# Patient Record
Sex: MALE | Race: WHITE
[De-identification: names, ages, dates, MRNs, and addresses within clinical notes are randomized per-mention and may not be internally consistent; named-entity substitution may affect disease eponyms.]

---

## 2020-02-21 ENCOUNTER — HOSPITAL ENCOUNTER (OUTPATIENT)
Dept: HOSPITAL 53 - M OPP | Age: 50
Discharge: HOME | End: 2020-02-21
Attending: INTERNAL MEDICINE
Payer: COMMERCIAL

## 2020-02-21 VITALS — HEIGHT: 71 IN | BODY MASS INDEX: 44.1 KG/M2 | WEIGHT: 315 LBS

## 2020-02-21 VITALS — DIASTOLIC BLOOD PRESSURE: 81 MMHG | SYSTOLIC BLOOD PRESSURE: 138 MMHG

## 2020-02-21 DIAGNOSIS — K22.8: Primary | ICD-10-CM

## 2020-02-21 DIAGNOSIS — K21.0: ICD-10-CM

## 2020-02-21 DIAGNOSIS — Z79.899: ICD-10-CM

## 2020-02-21 PROCEDURE — 43239 EGD BIOPSY SINGLE/MULTIPLE: CPT

## 2020-02-21 PROCEDURE — 88305 TISSUE EXAM BY PATHOLOGIST: CPT

## 2020-02-21 NOTE — ROOR
________________________________________________________________________________

Patient Name: Arnie Olmstead              Procedure Date: 2/21/2020 2:31 PM

MRN: [id number]                          Account Number: [id number]

YOB: 1970              Age: 49

Room: [facility]                            Gender: Male

Note Status: Finalized                 

________________________________________________________________________________

 

Procedure:           Upper GI endoscopy

Indications:         Follow-up of esophagitis, Suspected eosinophilic 

                     esophagitis

Providers:           Wilberto Bennett MD

Referring MD:        NOAM SOLANO MD

Requesting Provider: 

Medicines:           Monitored Anesthesia Care

Complications:       No immediate complications.

________________________________________________________________________________

Procedure:           Pre-Anesthesia Assessment:

                     - Prior to the procedure, a History and Physical was 

                     performed, and patient medications and allergies were 

                     reviewed. The patient is competent. The risks and 

                     benefits of the procedure and the sedation options and 

                     risks were discussed with the patient. All questions were 

                     answered and informed consent was obtained. Patient 

                     identification and proposed procedure were verified by 

                     the physician, the nurse and the anesthesiologist in the 

                     procedure room. Mental Status Examination: alert and 

                     oriented. Airway Examination: normal oropharyngeal airway 

                     and neck mobility. Respiratory Examination: clear to 

                     auscultation. CV Examination: normal. Prophylactic 

                     Antibiotics: The patient does not require prophylactic 

                     antibiotics. Prior Anticoagulants: The patient has taken 

                     no previous anticoagulant or antiplatelet agents. ASA 

                     Grade Assessment: II - A patient with mild systemic 

                     disease. After reviewing the risks and benefits, the 

                     patient was deemed in satisfactory condition to undergo 

                     the procedure. The anesthesia plan was to use monitored 

                     anesthesia care (MAC). Immediately prior to 

                     administration of medications, the patient was 

                     re-assessed for adequacy to receive sedatives. The heart 

                     rate, respiratory rate, oxygen saturations, blood 

                     pressure, adequacy of pulmonary ventilation, and response 

                     to care were monitored throughout the procedure. The 

                     physical status of the patient was re-assessed after the 

                     procedure.

                     The Endoscope was introduced through the mouth, and 

                     advanced to the second part of duodenum. The upper GI 

                     endoscopy was accomplished without difficulty. The 

                     patient tolerated the procedure well.

                                                                                

Findings:

     LA Grade C (one or more mucosal breaks continuous between tops of 2 or 

     more mucosal folds, less than 75% circumference) esophagitis with no 

     bleeding was found in the distal esophagus. Biopsies were taken with a 

     cold forceps for histology. Verification of patient identification for 

     the specimen was done by the physician and nurse using the patient's 

     name, birth date and medical record number. Estimated blood loss was 

     minimal.

     Mucosal changes including feline appearance, longitudinal furrows, white 

     plaques and longitudinal markings were found in the middle third of the 

     esophagus and in the lower third of the esophagus. Biopsies were obtained 

     from the proximal and distal esophagus with cold forceps for histology of 

     suspected eosinophilic esophagitis.

     The entire examined stomach was normal.

     The duodenal bulb and second portion of the duodenum were normal.

                                                                                

Impression:          - LA Grade C reflux esophagitis. Rule out Manzano's 

                     esophagus. Biopsied.

                     - Esophageal mucosal changes suggestive of eosinophilic 

                     esophagitis. Biopsied.

                     - Normal stomach.

                     - Normal duodenal bulb and second portion of the duodenum.

Recommendation:      - Patient has a contact number available for emergencies. 

                     The signs and symptoms of potential delayed complications 

                     were discussed with the patient. Return to normal 

                     activities tomorrow. Written discharge instructions were 

                     provided to the patient.

                     - Resume previous diet.

                     - Avoid the food allergens. Follow Six Food Elimination 

                     Diet ( Avoid -- milk, soy, eggs, wheat, peanuts/tree 

                     nuts, and seafood), until allergy testing is done.

                     - Continue present medications.

                     - Follow an antireflux regimen.

                     - Await pathology results.

                     - Repeat upper endoscopy depending on the symptoms and 

                     clinical response.

                     - Telephone GI clinic for pathology results in 2 weeks.

                     - Return to primary care physician.

                                                                                

 

Wilberto Bennett MD

_______________________

Wilberto Bennett MD

2/21/2020 2:59:08 PM

Electronically signed by Wilberto Bennett MD

Number of Addenda: 0

 

Note Initiated On: 2/21/2020 2:31 PM

Estimated Blood Loss:

     Estimated blood loss was minimal.

## 2020-08-14 ENCOUNTER — HOSPITAL ENCOUNTER (OUTPATIENT)
Dept: HOSPITAL 53 - M SDC | Age: 50
Discharge: HOME | End: 2020-08-14
Attending: INTERNAL MEDICINE
Payer: COMMERCIAL

## 2020-08-14 DIAGNOSIS — Z03.818: Primary | ICD-10-CM

## 2020-08-14 DIAGNOSIS — Z79.899: ICD-10-CM

## 2020-08-14 DIAGNOSIS — K29.70: ICD-10-CM

## 2020-08-14 DIAGNOSIS — K44.9: ICD-10-CM

## 2020-08-14 DIAGNOSIS — K20.0: ICD-10-CM

## 2020-08-14 DIAGNOSIS — K21.9: ICD-10-CM

## 2020-08-14 PROCEDURE — 88305 TISSUE EXAM BY PATHOLOGIST: CPT

## 2020-08-14 PROCEDURE — 43239 EGD BIOPSY SINGLE/MULTIPLE: CPT

## 2020-09-09 NOTE — ROOR
________________________________________________________________________________

Patient Name: Arnie Olmstead              Procedure Date: 8/14/2020 12:57 PM

MRN: [id number]                          Account Number: [id number]

YOB: 1970              Age: 49

Room: [facility]                            Gender: Male

Note Status: Supervisor Override       

________________________________________________________________________________

 

Procedure:           Upper GI endoscopy

Indications:         Follow-up of eosinophilic esophagitis, Follow-up of 

                     reflux esophagitis, Follow-up of gastro-esophageal reflux 

                     disease

Providers:           Wilberto Bennett MD

Referring MD:        NOAM SOLANO MD

Requesting Provider: 

Medicines:           Monitored Anesthesia Care

Complications:       No immediate complications.

________________________________________________________________________________

Procedure:           Pre-Anesthesia Assessment:

                     - Prior to the procedure, a History and Physical was 

                     performed, and patient medications and allergies were 

                     reviewed. The patient is competent. The risks and 

                     benefits of the procedure and the sedation options and 

                     risks were discussed with the patient. All questions were 

                     answered and informed consent was obtained. Patient 

                     identification and proposed procedure were verified by 

                     the physician, the nurse and the anesthesiologist in the 

                     procedure room. Mental Status Examination: alert and 

                     oriented. Airway Examination: normal oropharyngeal airway 

                     and neck mobility. Respiratory Examination: clear to 

                     auscultation. CV Examination: normal. Prophylactic 

                     Antibiotics: The patient does not require prophylactic 

                     antibiotics. Prior Anticoagulants: The patient has taken 

                     no previous anticoagulant or antiplatelet agents. ASA 

                     Grade Assessment: II - A patient with mild systemic 

                     disease. After reviewing the risks and benefits, the 

                     patient was deemed in satisfactory condition to undergo 

                     the procedure. The anesthesia plan was to use monitored 

                     anesthesia care (MAC). Immediately prior to 

                     administration of medications, the patient was 

                     re-assessed for adequacy to receive sedatives. The heart 

                     rate, respiratory rate, oxygen saturations, blood 

                     pressure, adequacy of pulmonary ventilation, and response 

                     to care were monitored throughout the procedure. The 

                     physical status of the patient was re-assessed after the 

                     procedure.

                     The Endoscope was introduced through the mouth, and 

                     advanced to the second part of duodenum. The upper GI 

                     endoscopy was accomplished without difficulty. The 

                     patient tolerated the procedure well.

                                                                                

Findings:

     Savary-Palomo Grade IV (Grade I-III, complicated by ulcer(s), 

     stricture(s), or shortening) esophagitis with no bleeding was found in 

     the distal esophagus. Biopsies were taken with a cold forceps for 

     histology. Biopsies were obtained from the proximal and distal esophagus 

     with cold forceps for histology of suspected eosinophilic esophagitis. 

     Verification of patient identification for the specimen was done by the 

     physician and nurse using the patient's name, birth date and medical 

     record number.

     A medium-sized hiatal hernia was present.

     Scattered moderate inflammation characterized by erythema and granularity 

     was found in the gastric antrum. Biopsies were taken with a cold forceps 

     for Helicobacter pylori testing.

     The duodenal bulb and second portion of the duodenum were normal.

                                                                                

Impression:          - Savary-Palomo Grade IV reflux esophagitis. Rule out 

                     Manzano's esophagus. Biopsied.

                     - Medium-sized hiatal hernia.

                     - Gastritis. Biopsied.

                     - Normal duodenal bulb and second portion of the duodenum.

Recommendation:      - Patient has a contact number available for emergencies. 

                     The signs and symptoms of potential delayed complications 

                     were discussed with the patient. Return to normal 

                     activities tomorrow. Written discharge instructions were 

                     provided to the patient.

                     - Anti-acid reflux diet -- small meals, sit upright 

                     atleast 1 hour after meals, avoid fatty/ oily foods and 

                     avoid foods that cause reflux.

                     - Continue present medications.

                     - Follow an antireflux regimen.

                     - Await pathology results.

                     - Repeat upper endoscopy in 1 year to check healing and 

                     to evaluate the response to therapy.

                     - Telephone GI clinic for pathology results in 2 weeks.

                     - Return to primary care physician.

                                                                                

 

Wilberto Bennett MD

_______________________

Wilberto Bennett MD

8/14/2020 1:21:00 PM

Number of Addenda: 0

 

Note Initiated On: 8/14/2020 12:57 PM

Estimated Blood Loss:

     Estimated blood loss was minimal.